# Patient Record
Sex: FEMALE | Race: WHITE | Employment: STUDENT | URBAN - METROPOLITAN AREA
[De-identification: names, ages, dates, MRNs, and addresses within clinical notes are randomized per-mention and may not be internally consistent; named-entity substitution may affect disease eponyms.]

---
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
## 2023-05-24 ENCOUNTER — TELEPHONE (OUTPATIENT)
Dept: INTERNAL MEDICINE | Age: 23
End: 2023-05-24

## 2023-07-03 ENCOUNTER — OFFICE VISIT (OUTPATIENT)
Dept: INTERNAL MEDICINE | Age: 23
End: 2023-07-03

## 2023-07-03 ENCOUNTER — LAB SERVICES (OUTPATIENT)
Dept: LAB | Age: 23
End: 2023-07-03

## 2023-07-03 VITALS
HEIGHT: 67 IN | TEMPERATURE: 97.7 F | DIASTOLIC BLOOD PRESSURE: 70 MMHG | SYSTOLIC BLOOD PRESSURE: 110 MMHG | WEIGHT: 181 LBS | BODY MASS INDEX: 28.41 KG/M2 | HEART RATE: 96 BPM | OXYGEN SATURATION: 98 %

## 2023-07-03 DIAGNOSIS — R53.82 CHRONIC FATIGUE: ICD-10-CM

## 2023-07-03 DIAGNOSIS — R53.83 DECREASED ENERGY: ICD-10-CM

## 2023-07-03 DIAGNOSIS — R42 DIZZINESS: ICD-10-CM

## 2023-07-03 DIAGNOSIS — F32.0 CURRENT MILD EPISODE OF MAJOR DEPRESSIVE DISORDER WITHOUT PRIOR EPISODE (CMD): ICD-10-CM

## 2023-07-03 DIAGNOSIS — R55 SYNCOPE, UNSPECIFIED SYNCOPE TYPE: ICD-10-CM

## 2023-07-03 DIAGNOSIS — R00.0 TACHYCARDIA: Primary | ICD-10-CM

## 2023-07-03 PROBLEM — F41.1 GAD (GENERALIZED ANXIETY DISORDER): Status: ACTIVE | Noted: 2017-07-18

## 2023-07-03 PROBLEM — F40.10 SOCIAL ANXIETY DISORDER: Status: ACTIVE | Noted: 2017-07-18

## 2023-07-03 LAB
ALBUMIN SERPL-MCNC: 3.8 G/DL (ref 3.6–5.1)
ALBUMIN/GLOB SERPL: 0.9 {RATIO} (ref 1–2.4)
ALP SERPL-CCNC: 111 UNITS/L (ref 45–117)
ALT SERPL-CCNC: 19 UNITS/L
ANION GAP SERPL CALC-SCNC: 13 MMOL/L (ref 7–19)
AST SERPL-CCNC: 14 UNITS/L
BILIRUB SERPL-MCNC: 0.2 MG/DL (ref 0.2–1)
BUN SERPL-MCNC: 9 MG/DL (ref 6–20)
BUN/CREAT SERPL: 14 (ref 7–25)
CALCIUM SERPL-MCNC: 8.9 MG/DL (ref 8.4–10.2)
CHLORIDE SERPL-SCNC: 101 MMOL/L (ref 97–110)
CO2 SERPL-SCNC: 28 MMOL/L (ref 21–32)
CREAT SERPL-MCNC: 0.64 MG/DL (ref 0.51–0.95)
FASTING DURATION TIME PATIENT: ABNORMAL H
GFR SERPLBLD BASED ON 1.73 SQ M-ARVRAT: >90 ML/MIN
GLOBULIN SER-MCNC: 4.1 G/DL (ref 2–4)
GLUCOSE SERPL-MCNC: 80 MG/DL (ref 70–99)
POTASSIUM SERPL-SCNC: 4.2 MMOL/L (ref 3.4–5.1)
PROT SERPL-MCNC: 7.9 G/DL (ref 6.4–8.2)
SODIUM SERPL-SCNC: 138 MMOL/L (ref 135–145)
TSH SERPL-ACNC: 1.74 MCUNITS/ML (ref 0.35–5)

## 2023-07-03 PROCEDURE — 80053 COMPREHEN METABOLIC PANEL: CPT | Performed by: INTERNAL MEDICINE

## 2023-07-03 PROCEDURE — 84443 ASSAY THYROID STIM HORMONE: CPT | Performed by: INTERNAL MEDICINE

## 2023-07-03 PROCEDURE — 99204 OFFICE O/P NEW MOD 45 MIN: CPT | Performed by: FAMILY MEDICINE

## 2023-07-03 PROCEDURE — 36415 COLL VENOUS BLD VENIPUNCTURE: CPT | Performed by: FAMILY MEDICINE

## 2023-07-03 RX ORDER — EPINEPHRINE 0.3 MG/.3ML
INJECTION SUBCUTANEOUS
COMMUNITY
Start: 2023-04-27

## 2023-07-03 RX ORDER — IBUPROFEN 400 MG/1
400 TABLET ORAL EVERY 6 HOURS PRN
COMMUNITY

## 2023-07-03 RX ORDER — LEVOCETIRIZINE DIHYDROCHLORIDE 5 MG/1
10 TABLET, FILM COATED ORAL DAILY
COMMUNITY

## 2023-07-03 RX ORDER — ETONOGESTREL AND ETHINYL ESTRADIOL .12; .015 MG/D; MG/D
RING VAGINAL
COMMUNITY
Start: 2023-06-06 | End: 2023-10-27 | Stop reason: SDUPTHER

## 2023-09-12 ENCOUNTER — OFFICE VISIT (OUTPATIENT)
Dept: CARDIOLOGY | Age: 23
End: 2023-09-12
Attending: FAMILY MEDICINE

## 2023-09-12 VITALS
WEIGHT: 182 LBS | BODY MASS INDEX: 28.56 KG/M2 | HEART RATE: 100 BPM | DIASTOLIC BLOOD PRESSURE: 82 MMHG | HEIGHT: 67 IN | SYSTOLIC BLOOD PRESSURE: 112 MMHG | OXYGEN SATURATION: 97 % | RESPIRATION RATE: 18 BRPM

## 2023-09-12 DIAGNOSIS — G90.A POTS (POSTURAL ORTHOSTATIC TACHYCARDIA SYNDROME): Primary | ICD-10-CM

## 2023-09-12 DIAGNOSIS — F41.9 ANXIETY: ICD-10-CM

## 2023-09-12 DIAGNOSIS — R42 LIGHTHEADED: ICD-10-CM

## 2023-09-12 DIAGNOSIS — R00.0 TACHYCARDIA, UNSPECIFIED: ICD-10-CM

## 2023-09-12 DIAGNOSIS — R42 DIZZINESS: ICD-10-CM

## 2023-09-12 PROCEDURE — 99204 OFFICE O/P NEW MOD 45 MIN: CPT | Performed by: INTERNAL MEDICINE

## 2023-09-12 RX ORDER — UBIDECARENONE 75 MG
50 CAPSULE ORAL DAILY
COMMUNITY

## 2023-09-12 RX ORDER — MIDODRINE HYDROCHLORIDE 2.5 MG/1
2.5 TABLET ORAL 2 TIMES DAILY
Qty: 60 TABLET | Refills: 6 | Status: SHIPPED | OUTPATIENT
Start: 2023-09-12 | End: 2023-10-11 | Stop reason: SDUPTHER

## 2023-09-12 RX ORDER — VITAMIN B COMPLEX
25 TABLET ORAL DAILY
COMMUNITY

## 2023-09-12 ASSESSMENT — PATIENT HEALTH QUESTIONNAIRE - PHQ9
2. FEELING DOWN, DEPRESSED OR HOPELESS: NOT AT ALL
CLINICAL INTERPRETATION OF PHQ2 SCORE: NO FURTHER SCREENING NEEDED
SUM OF ALL RESPONSES TO PHQ9 QUESTIONS 1 AND 2: 0
SUM OF ALL RESPONSES TO PHQ9 QUESTIONS 1 AND 2: 0
1. LITTLE INTEREST OR PLEASURE IN DOING THINGS: NOT AT ALL

## 2023-09-25 ENCOUNTER — ANCILLARY PROCEDURE (OUTPATIENT)
Dept: CARDIOLOGY | Age: 23
End: 2023-09-25
Attending: INTERNAL MEDICINE

## 2023-09-25 ENCOUNTER — TELEPHONE (OUTPATIENT)
Dept: CARDIOLOGY | Age: 23
End: 2023-09-25

## 2023-09-25 DIAGNOSIS — R00.0 TACHYCARDIA, UNSPECIFIED: ICD-10-CM

## 2023-09-25 DIAGNOSIS — R42 LIGHTHEADED: ICD-10-CM

## 2023-09-28 PROCEDURE — 93226 XTRNL ECG REC<48 HR SCAN A/R: CPT | Performed by: INTERNAL MEDICINE

## 2023-09-28 PROCEDURE — 93227 XTRNL ECG REC<48 HR R&I: CPT | Performed by: INTERNAL MEDICINE

## 2023-10-04 ENCOUNTER — OFFICE VISIT (OUTPATIENT)
Dept: INTERNAL MEDICINE | Age: 23
End: 2023-10-04

## 2023-10-04 ENCOUNTER — IMAGING SERVICES (OUTPATIENT)
Dept: GENERAL RADIOLOGY | Age: 23
End: 2023-10-04
Attending: FAMILY MEDICINE

## 2023-10-04 VITALS
SYSTOLIC BLOOD PRESSURE: 120 MMHG | BODY MASS INDEX: 29.19 KG/M2 | WEIGHT: 186 LBS | HEIGHT: 67 IN | DIASTOLIC BLOOD PRESSURE: 78 MMHG | HEART RATE: 92 BPM | TEMPERATURE: 96.7 F | OXYGEN SATURATION: 98 %

## 2023-10-04 DIAGNOSIS — R42 DIZZINESS: ICD-10-CM

## 2023-10-04 DIAGNOSIS — R29.898 TRANSIENT LEFT LEG WEAKNESS: ICD-10-CM

## 2023-10-04 DIAGNOSIS — R53.82 CHRONIC FATIGUE: Primary | ICD-10-CM

## 2023-10-04 DIAGNOSIS — R55 SYNCOPE, UNSPECIFIED SYNCOPE TYPE: ICD-10-CM

## 2023-10-04 DIAGNOSIS — R53.83 DECREASED ENERGY: ICD-10-CM

## 2023-10-04 DIAGNOSIS — R00.0 TACHYCARDIA: ICD-10-CM

## 2023-10-04 PROBLEM — F40.10 SOCIAL ANXIETY DISORDER: Status: RESOLVED | Noted: 2017-07-18 | Resolved: 2023-10-04

## 2023-10-04 PROCEDURE — 72110 X-RAY EXAM L-2 SPINE 4/>VWS: CPT | Performed by: RADIOLOGY

## 2023-10-04 PROCEDURE — 99214 OFFICE O/P EST MOD 30 MIN: CPT | Performed by: FAMILY MEDICINE

## 2023-10-04 ASSESSMENT — PATIENT HEALTH QUESTIONNAIRE - PHQ9
CLINICAL INTERPRETATION OF PHQ2 SCORE: NO FURTHER SCREENING NEEDED
SUM OF ALL RESPONSES TO PHQ9 QUESTIONS 1 AND 2: 0
2. FEELING DOWN, DEPRESSED OR HOPELESS: NOT AT ALL
SUM OF ALL RESPONSES TO PHQ9 QUESTIONS 1 AND 2: 0
1. LITTLE INTEREST OR PLEASURE IN DOING THINGS: NOT AT ALL

## 2023-10-06 ENCOUNTER — TELEPHONE (OUTPATIENT)
Dept: CARDIOLOGY | Age: 23
End: 2023-10-06

## 2023-10-06 DIAGNOSIS — R00.0 TACHYCARDIA, UNSPECIFIED: ICD-10-CM

## 2023-10-06 DIAGNOSIS — R42 LIGHTHEADED: ICD-10-CM

## 2023-10-06 DIAGNOSIS — G90.A POTS (POSTURAL ORTHOSTATIC TACHYCARDIA SYNDROME): ICD-10-CM

## 2023-10-11 ENCOUNTER — TELEPHONE (OUTPATIENT)
Dept: INTERNAL MEDICINE | Age: 23
End: 2023-10-11

## 2023-10-11 ENCOUNTER — TELEPHONE (OUTPATIENT)
Dept: CARDIOLOGY | Age: 23
End: 2023-10-11

## 2023-10-11 RX ORDER — MIDODRINE HYDROCHLORIDE 2.5 MG/1
5 TABLET ORAL 3 TIMES DAILY
Qty: 30 TABLET | Refills: 0 | Status: SHIPPED | COMMUNITY
Start: 2023-10-11 | End: 2023-11-14 | Stop reason: DRUGHIGH

## 2023-10-19 ENCOUNTER — TELEPHONE (OUTPATIENT)
Dept: INTERNAL MEDICINE | Age: 23
End: 2023-10-19

## 2023-10-27 RX ORDER — ETONOGESTREL AND ETHINYL ESTRADIOL .12; .015 MG/D; MG/D
1 RING VAGINAL SEE ADMIN INSTRUCTIONS
Qty: 1 EACH | Refills: 0 | Status: SHIPPED | OUTPATIENT
Start: 2023-10-27

## 2023-11-13 ENCOUNTER — OFFICE VISIT (OUTPATIENT)
Dept: INTERNAL MEDICINE | Age: 23
End: 2023-11-13

## 2023-11-13 VITALS
HEIGHT: 67 IN | WEIGHT: 186 LBS | DIASTOLIC BLOOD PRESSURE: 70 MMHG | SYSTOLIC BLOOD PRESSURE: 120 MMHG | TEMPERATURE: 96.5 F | HEART RATE: 110 BPM | OXYGEN SATURATION: 99 % | BODY MASS INDEX: 29.19 KG/M2

## 2023-11-13 DIAGNOSIS — F32.0 CURRENT MILD EPISODE OF MAJOR DEPRESSIVE DISORDER WITHOUT PRIOR EPISODE (CMD): ICD-10-CM

## 2023-11-13 DIAGNOSIS — F41.1 GAD (GENERALIZED ANXIETY DISORDER): ICD-10-CM

## 2023-11-13 DIAGNOSIS — R55 SYNCOPE, UNSPECIFIED SYNCOPE TYPE: ICD-10-CM

## 2023-11-13 DIAGNOSIS — R29.898 TRANSIENT LEFT LEG WEAKNESS: Primary | ICD-10-CM

## 2023-11-13 DIAGNOSIS — R00.0 TACHYCARDIA: ICD-10-CM

## 2023-11-13 PROCEDURE — 99214 OFFICE O/P EST MOD 30 MIN: CPT | Performed by: FAMILY MEDICINE

## 2023-11-13 ASSESSMENT — PATIENT HEALTH QUESTIONNAIRE - PHQ9
SUM OF ALL RESPONSES TO PHQ9 QUESTIONS 1 AND 2: 0
SUM OF ALL RESPONSES TO PHQ9 QUESTIONS 1 AND 2: 0
1. LITTLE INTEREST OR PLEASURE IN DOING THINGS: NOT AT ALL
CLINICAL INTERPRETATION OF PHQ2 SCORE: NO FURTHER SCREENING NEEDED
2. FEELING DOWN, DEPRESSED OR HOPELESS: NOT AT ALL

## 2023-11-14 ENCOUNTER — OFFICE VISIT (OUTPATIENT)
Dept: CARDIOLOGY | Age: 23
End: 2023-11-14

## 2023-11-14 VITALS
OXYGEN SATURATION: 98 % | BODY MASS INDEX: 29.66 KG/M2 | SYSTOLIC BLOOD PRESSURE: 128 MMHG | WEIGHT: 189 LBS | HEART RATE: 89 BPM | HEIGHT: 67 IN | DIASTOLIC BLOOD PRESSURE: 82 MMHG

## 2023-11-14 DIAGNOSIS — F40.10 SOCIAL ANXIETY DISORDER: ICD-10-CM

## 2023-11-14 DIAGNOSIS — R55 SYNCOPE AND COLLAPSE: Primary | ICD-10-CM

## 2023-11-14 DIAGNOSIS — F32.A DEPRESSION, UNSPECIFIED DEPRESSION TYPE: ICD-10-CM

## 2023-11-14 DIAGNOSIS — R42 LIGHTHEADED: ICD-10-CM

## 2023-11-14 DIAGNOSIS — R00.0 TACHYCARDIA, UNSPECIFIED: ICD-10-CM

## 2023-11-14 DIAGNOSIS — R10.9 STOMACH PAIN: ICD-10-CM

## 2023-11-14 DIAGNOSIS — R11.0 NAUSEA: ICD-10-CM

## 2023-11-14 DIAGNOSIS — G90.A POTS (POSTURAL ORTHOSTATIC TACHYCARDIA SYNDROME): ICD-10-CM

## 2023-11-14 PROCEDURE — 99214 OFFICE O/P EST MOD 30 MIN: CPT

## 2023-11-14 RX ORDER — MIDODRINE HYDROCHLORIDE 10 MG/1
10 TABLET ORAL 3 TIMES DAILY
Qty: 90 TABLET | Refills: 3 | Status: SHIPPED | OUTPATIENT
Start: 2023-11-14

## 2023-11-14 RX ORDER — PANTOPRAZOLE SODIUM 40 MG/1
40 TABLET, DELAYED RELEASE ORAL DAILY
Qty: 90 TABLET | Refills: 1 | Status: SHIPPED | OUTPATIENT
Start: 2023-11-14

## 2023-11-27 ENCOUNTER — APPOINTMENT (OUTPATIENT)
Dept: MRI IMAGING | Age: 23
End: 2023-11-27
Attending: FAMILY MEDICINE

## 2023-11-27 DIAGNOSIS — R29.898 TRANSIENT LEFT LEG WEAKNESS: ICD-10-CM

## 2023-11-27 PROCEDURE — 72148 MRI LUMBAR SPINE W/O DYE: CPT | Performed by: RADIOLOGY

## 2023-12-04 ENCOUNTER — E-ADVICE (OUTPATIENT)
Dept: GASTROENTEROLOGY | Age: 23
End: 2023-12-04

## 2023-12-04 ENCOUNTER — APPOINTMENT (OUTPATIENT)
Dept: GASTROENTEROLOGY | Age: 23
End: 2023-12-04

## 2023-12-04 VITALS — WEIGHT: 190.7 LBS | HEIGHT: 67 IN | BODY MASS INDEX: 29.93 KG/M2

## 2023-12-04 DIAGNOSIS — K59.00 CONSTIPATION, UNSPECIFIED CONSTIPATION TYPE: ICD-10-CM

## 2023-12-04 DIAGNOSIS — K58.1 IRRITABLE BOWEL SYNDROME WITH CONSTIPATION: ICD-10-CM

## 2023-12-04 DIAGNOSIS — R11.0 CHRONIC NAUSEA: Primary | ICD-10-CM

## 2023-12-04 PROCEDURE — 99204 OFFICE O/P NEW MOD 45 MIN: CPT | Performed by: STUDENT IN AN ORGANIZED HEALTH CARE EDUCATION/TRAINING PROGRAM

## 2023-12-04 RX ORDER — BISACODYL 5 MG/1
TABLET, DELAYED RELEASE ORAL
Qty: 2 TABLET | Refills: 0 | Status: SHIPPED | OUTPATIENT
Start: 2023-12-04 | End: 2024-01-18

## 2023-12-04 RX ORDER — SODIUM, POTASSIUM,MAG SULFATES 17.5-3.13G
SOLUTION, RECONSTITUTED, ORAL ORAL
Qty: 1 KIT | Refills: 0 | Status: SHIPPED | OUTPATIENT
Start: 2023-12-04 | End: 2024-01-18

## 2023-12-04 RX ORDER — SIMETHICONE 125 MG
TABLET,CHEWABLE ORAL
Qty: 2 TABLET | Refills: 0 | Status: SHIPPED | OUTPATIENT
Start: 2023-12-04 | End: 2024-01-18

## 2024-01-09 ENCOUNTER — HOSPITAL ENCOUNTER (OUTPATIENT)
Dept: GASTROENTEROLOGY | Age: 24
Discharge: HOME | End: 2024-01-09
Attending: STUDENT IN AN ORGANIZED HEALTH CARE EDUCATION/TRAINING PROGRAM

## 2024-01-09 ENCOUNTER — ANESTHESIA (OUTPATIENT)
Dept: GASTROENTEROLOGY | Age: 24
End: 2024-01-09

## 2024-01-09 ENCOUNTER — ANESTHESIA EVENT (OUTPATIENT)
Dept: GASTROENTEROLOGY | Age: 24
End: 2024-01-09

## 2024-01-09 VITALS
HEIGHT: 67 IN | DIASTOLIC BLOOD PRESSURE: 63 MMHG | OXYGEN SATURATION: 98 % | WEIGHT: 188.05 LBS | TEMPERATURE: 96.8 F | HEART RATE: 93 BPM | SYSTOLIC BLOOD PRESSURE: 129 MMHG | BODY MASS INDEX: 29.52 KG/M2 | RESPIRATION RATE: 16 BRPM

## 2024-01-09 DIAGNOSIS — R11.0 CHRONIC NAUSEA: ICD-10-CM

## 2024-01-09 DIAGNOSIS — K59.00 CONSTIPATION, UNSPECIFIED CONSTIPATION TYPE: ICD-10-CM

## 2024-01-09 LAB
B-HCG UR QL: NEGATIVE
INTERNAL PROCEDURAL CONTROLS ACCEPTABLE: YES
TEST LOT EXPIRATION DATE: NORMAL
TEST LOT NUMBER: NORMAL

## 2024-01-09 PROCEDURE — 13000028 HB GI MAJOR COMPLEX CASE S/U + 1ST 15 MIN

## 2024-01-09 PROCEDURE — 10004451 HB PACU RECOVERY 1ST 30 MINUTES

## 2024-01-09 PROCEDURE — 10002807 HB RX 258: Performed by: STUDENT IN AN ORGANIZED HEALTH CARE EDUCATION/TRAINING PROGRAM

## 2024-01-09 PROCEDURE — 88305 TISSUE EXAM BY PATHOLOGIST: CPT | Performed by: STUDENT IN AN ORGANIZED HEALTH CARE EDUCATION/TRAINING PROGRAM

## 2024-01-09 PROCEDURE — 45378 DIAGNOSTIC COLONOSCOPY: CPT | Performed by: STUDENT IN AN ORGANIZED HEALTH CARE EDUCATION/TRAINING PROGRAM

## 2024-01-09 PROCEDURE — 81025 URINE PREGNANCY TEST: CPT | Performed by: STUDENT IN AN ORGANIZED HEALTH CARE EDUCATION/TRAINING PROGRAM

## 2024-01-09 PROCEDURE — 10002801 HB RX 250 W/O HCPCS: Performed by: STUDENT IN AN ORGANIZED HEALTH CARE EDUCATION/TRAINING PROGRAM

## 2024-01-09 PROCEDURE — 10002800 HB RX 250 W HCPCS: Performed by: STUDENT IN AN ORGANIZED HEALTH CARE EDUCATION/TRAINING PROGRAM

## 2024-01-09 PROCEDURE — 43239 EGD BIOPSY SINGLE/MULTIPLE: CPT | Performed by: STUDENT IN AN ORGANIZED HEALTH CARE EDUCATION/TRAINING PROGRAM

## 2024-01-09 PROCEDURE — 13000001 HB PHASE II RECOVERY EA 30 MINUTES

## 2024-01-09 PROCEDURE — 13000029 HB GI MAJOR COMPLEX CASE EA ADD MINUTE

## 2024-01-09 PROCEDURE — 13000008 HB ANESTHESIA MAC OUTSIDE OR

## 2024-01-09 RX ORDER — SODIUM CHLORIDE 9 MG/ML
INJECTION, SOLUTION INTRAVENOUS CONTINUOUS
Status: DISCONTINUED | OUTPATIENT
Start: 2024-01-09 | End: 2024-01-11 | Stop reason: HOSPADM

## 2024-01-09 RX ORDER — POLYETHYLENE GLYCOL 3350 17 G/17G
17 POWDER, FOR SOLUTION ORAL DAILY
COMMUNITY

## 2024-01-09 RX ORDER — SODIUM CHLORIDE 9 MG/ML
INJECTION, SOLUTION INTRAVENOUS CONTINUOUS PRN
Status: DISCONTINUED | OUTPATIENT
Start: 2024-01-09 | End: 2024-01-09

## 2024-01-09 RX ORDER — PROPOFOL 10 MG/ML
INJECTION, EMULSION INTRAVENOUS PRN
Status: DISCONTINUED | OUTPATIENT
Start: 2024-01-09 | End: 2024-01-09

## 2024-01-09 RX ADMIN — FENTANYL CITRATE 50 MCG: 50 INJECTION INTRAMUSCULAR; INTRAVENOUS at 14:39

## 2024-01-09 RX ADMIN — PROPOFOL 70 MG: 10 INJECTION, EMULSION INTRAVENOUS at 14:37

## 2024-01-09 RX ADMIN — SODIUM CHLORIDE: 9 INJECTION, SOLUTION INTRAVENOUS at 14:30

## 2024-01-09 RX ADMIN — FENTANYL CITRATE 50 MCG: 50 INJECTION INTRAMUSCULAR; INTRAVENOUS at 14:35

## 2024-01-09 RX ADMIN — SODIUM CHLORIDE: 9 INJECTION, SOLUTION INTRAVENOUS at 14:32

## 2024-01-09 RX ADMIN — PROPOFOL 80 MCG/KG/MIN: 10 INJECTION, EMULSION INTRAVENOUS at 14:35

## 2024-01-09 SDOH — SOCIAL STABILITY: SOCIAL INSECURITY: HOW OFTEN DOES ANYONE, INCLUDING FAMILY AND FRIENDS, SCREAM OR CURSE AT YOU?: NEVER

## 2024-01-09 SDOH — SOCIAL STABILITY: SOCIAL INSECURITY: HOW OFTEN DOES ANYONE, INCLUDING FAMILY AND FRIENDS, THREATEN YOU WITH HARM?: NEVER

## 2024-01-09 SDOH — SOCIAL STABILITY: SOCIAL INSECURITY: HOW OFTEN DOES ANYONE, INCLUDING FAMILY AND FRIENDS, PHYSICALLY HURT YOU?: NEVER

## 2024-01-09 SDOH — SOCIAL STABILITY: SOCIAL INSECURITY: HOW OFTEN DOES ANYONE, INCLUDING FAMILY AND FRIENDS, INSULT OR TALK DOWN TO YOU?: NEVER

## 2024-01-09 ASSESSMENT — ACTIVITIES OF DAILY LIVING (ADL)
TRANSFERRING: DEPENDENT
ADL_BEFORE_ADMISSION: INDEPENDENT
ADL_SCORE: 12
BATHING: NEEDS ASSISTANCE
RECENT_DECLINE_ADL: NO
CHRONIC_PAIN_PRESENT: NO
ADL_SHORT_OF_BREATH: YES
SENSORY_SUPPORT_DEVICES: EYEGLASSES
HISTORY OF FALLING IN THE LAST YEAR (PRIOR TO ADMISSION): YES
NEEDS_ASSIST: YES
ADL_SHORT_OF_BREATH: NO
ADL_SCORE: 2
ADL_BEFORE_ADMISSION: NEEDS/REQUIRES ASSISTANCE
CONTINENCE: DEPENDENT
TOILETING: DEPENDENT
RECENT_DECLINE_ADL: YES, DECLINE IN BATHING/DRESSING/FEEDING, COLLABORATE WITH PROVIDER (T)
FEEDING: DEPENDENT
DRESSING: NEEDS ASSISTANCE

## 2024-01-09 ASSESSMENT — ENCOUNTER SYMPTOMS
EXERCISE TOLERANCE: GOOD (>4 METS)
SEIZURES: 1

## 2024-01-09 ASSESSMENT — COGNITIVE AND FUNCTIONAL STATUS - GENERAL
ARE YOU BLIND OR DO YOU HAVE SERIOUS DIFFICULTY SEEING, EVEN WHEN WEARING GLASSES: NO
ARE YOU DEAF OR DO YOU HAVE SERIOUS DIFFICULTY  HEARING: NO

## 2024-01-09 ASSESSMENT — PAIN SCALES - WONG BAKER: WONGBAKER_NUMERICALRESPONSE: 0

## 2024-01-09 ASSESSMENT — PAIN SCALES - GENERAL
PAINLEVEL_OUTOF10: 0
PAINLEVEL_OUTOF10: 0
PAINLEVEL_OUTOF10: 5

## 2024-01-11 LAB
ASR DISCLAIMER: NORMAL
CASE RPRT: NORMAL
CLINICAL INFO: NORMAL
PATH REPORT.FINAL DX SPEC: NORMAL
PATH REPORT.GROSS SPEC: NORMAL

## 2024-01-12 ENCOUNTER — APPOINTMENT (OUTPATIENT)
Dept: CARDIOLOGY | Age: 24
End: 2024-01-12

## 2024-01-12 ENCOUNTER — TELEPHONE (OUTPATIENT)
Dept: CARDIOLOGY | Age: 24
End: 2024-01-12

## 2024-01-12 VITALS — HEART RATE: 90 BPM

## 2024-01-12 DIAGNOSIS — G90.A POTS (POSTURAL ORTHOSTATIC TACHYCARDIA SYNDROME): Primary | ICD-10-CM

## 2024-01-12 DIAGNOSIS — R55 SYNCOPE AND COLLAPSE: ICD-10-CM

## 2024-01-12 DIAGNOSIS — R42 DIZZINESS: ICD-10-CM

## 2024-01-12 DIAGNOSIS — F40.10 SOCIAL ANXIETY DISORDER: ICD-10-CM

## 2024-01-12 DIAGNOSIS — R06.02 SOB (SHORTNESS OF BREATH): ICD-10-CM

## 2024-01-12 PROCEDURE — 99215 OFFICE O/P EST HI 40 MIN: CPT | Performed by: INTERNAL MEDICINE

## 2024-01-12 RX ORDER — FLUDROCORTISONE ACETATE 0.1 MG/1
0.1 TABLET ORAL DAILY
Qty: 30 TABLET | Refills: 6 | Status: SHIPPED | OUTPATIENT
Start: 2024-01-12

## 2024-01-12 ASSESSMENT — PATIENT HEALTH QUESTIONNAIRE - PHQ9
CLINICAL INTERPRETATION OF PHQ2 SCORE: NO FURTHER SCREENING NEEDED
2. FEELING DOWN, DEPRESSED OR HOPELESS: NOT AT ALL
SUM OF ALL RESPONSES TO PHQ9 QUESTIONS 1 AND 2: 0
SUM OF ALL RESPONSES TO PHQ9 QUESTIONS 1 AND 2: 0
1. LITTLE INTEREST OR PLEASURE IN DOING THINGS: NOT AT ALL

## 2024-03-11 ENCOUNTER — TELEPHONE (OUTPATIENT)
Dept: INTERNAL MEDICINE | Age: 24
End: 2024-03-11

## 2024-03-12 ENCOUNTER — OFFICE VISIT (OUTPATIENT)
Dept: INTERNAL MEDICINE | Age: 24
End: 2024-03-12

## 2024-03-12 VITALS
OXYGEN SATURATION: 98 % | WEIGHT: 199 LBS | DIASTOLIC BLOOD PRESSURE: 80 MMHG | SYSTOLIC BLOOD PRESSURE: 118 MMHG | BODY MASS INDEX: 31.17 KG/M2 | HEART RATE: 95 BPM | TEMPERATURE: 97.2 F

## 2024-03-12 DIAGNOSIS — R51.9 CHRONIC INTRACTABLE HEADACHE, UNSPECIFIED HEADACHE TYPE: Primary | ICD-10-CM

## 2024-03-12 DIAGNOSIS — G89.29 CHRONIC INTRACTABLE HEADACHE, UNSPECIFIED HEADACHE TYPE: Primary | ICD-10-CM

## 2024-03-12 PROCEDURE — 99214 OFFICE O/P EST MOD 30 MIN: CPT | Performed by: FAMILY MEDICINE

## 2024-03-12 RX ORDER — SUMATRIPTAN 50 MG/1
50 TABLET, FILM COATED ORAL DAILY PRN
Qty: 12 TABLET | Refills: 1 | Status: SHIPPED | OUTPATIENT
Start: 2024-03-12

## 2024-03-12 ASSESSMENT — PATIENT HEALTH QUESTIONNAIRE - PHQ9
SUM OF ALL RESPONSES TO PHQ9 QUESTIONS 1 AND 2: 0
2. FEELING DOWN, DEPRESSED OR HOPELESS: NOT AT ALL
SUM OF ALL RESPONSES TO PHQ9 QUESTIONS 1 AND 2: 0
CLINICAL INTERPRETATION OF PHQ2 SCORE: NO FURTHER SCREENING NEEDED
1. LITTLE INTEREST OR PLEASURE IN DOING THINGS: NOT AT ALL

## 2024-03-18 ENCOUNTER — LAB SERVICES (OUTPATIENT)
Dept: LAB | Age: 24
End: 2024-03-18

## 2024-03-18 ENCOUNTER — OFFICE VISIT (OUTPATIENT)
Dept: INTERNAL MEDICINE | Age: 24
End: 2024-03-18

## 2024-03-18 VITALS
SYSTOLIC BLOOD PRESSURE: 114 MMHG | HEART RATE: 94 BPM | WEIGHT: 199 LBS | BODY MASS INDEX: 31.17 KG/M2 | OXYGEN SATURATION: 97 % | TEMPERATURE: 96.2 F | DIASTOLIC BLOOD PRESSURE: 72 MMHG

## 2024-03-18 DIAGNOSIS — R06.02 SOB (SHORTNESS OF BREATH): ICD-10-CM

## 2024-03-18 DIAGNOSIS — G89.29 CHRONIC INTRACTABLE HEADACHE, UNSPECIFIED HEADACHE TYPE: ICD-10-CM

## 2024-03-18 DIAGNOSIS — R42 DIZZINESS: ICD-10-CM

## 2024-03-18 DIAGNOSIS — G90.A POTS (POSTURAL ORTHOSTATIC TACHYCARDIA SYNDROME): ICD-10-CM

## 2024-03-18 DIAGNOSIS — R51.9 CHRONIC INTRACTABLE HEADACHE, UNSPECIFIED HEADACHE TYPE: ICD-10-CM

## 2024-03-18 DIAGNOSIS — R07.9 CHEST PAIN, UNSPECIFIED TYPE: Primary | ICD-10-CM

## 2024-03-18 DIAGNOSIS — R07.9 CHEST PAIN, UNSPECIFIED TYPE: ICD-10-CM

## 2024-03-18 LAB — D DIMER PPP FEU-MCNC: 0.28 MG/L (FEU)

## 2024-03-18 PROCEDURE — 36415 COLL VENOUS BLD VENIPUNCTURE: CPT | Performed by: FAMILY MEDICINE

## 2024-03-18 PROCEDURE — 85379 FIBRIN DEGRADATION QUANT: CPT | Performed by: INTERNAL MEDICINE

## 2024-03-18 PROCEDURE — 93000 ELECTROCARDIOGRAM COMPLETE: CPT | Performed by: FAMILY MEDICINE

## 2024-03-18 PROCEDURE — 99214 OFFICE O/P EST MOD 30 MIN: CPT | Performed by: FAMILY MEDICINE

## 2024-03-18 RX ORDER — METHYLPREDNISOLONE 4 MG
TABLET, DOSE PACK ORAL
Qty: 1 TABLET | Refills: 0 | Status: SHIPPED | OUTPATIENT
Start: 2024-03-18

## 2024-03-18 RX ORDER — MECLIZINE HYDROCHLORIDE 25 MG/1
25 TABLET ORAL 3 TIMES DAILY PRN
Qty: 30 TABLET | Refills: 1 | Status: SHIPPED | OUTPATIENT
Start: 2024-03-18

## 2024-03-18 ASSESSMENT — PATIENT HEALTH QUESTIONNAIRE - PHQ9
SUM OF ALL RESPONSES TO PHQ9 QUESTIONS 1 AND 2: 2
CLINICAL INTERPRETATION OF PHQ2 SCORE: NO FURTHER SCREENING NEEDED
2. FEELING DOWN, DEPRESSED OR HOPELESS: MORE THAN HALF THE DAYS
SUM OF ALL RESPONSES TO PHQ9 QUESTIONS 1 AND 2: 2
1. LITTLE INTEREST OR PLEASURE IN DOING THINGS: NOT AT ALL

## 2024-04-03 ENCOUNTER — APPOINTMENT (OUTPATIENT)
Dept: MRI IMAGING | Age: 24
End: 2024-04-03
Attending: FAMILY MEDICINE

## 2024-04-03 DIAGNOSIS — G89.29 CHRONIC INTRACTABLE HEADACHE, UNSPECIFIED HEADACHE TYPE: ICD-10-CM

## 2024-04-03 DIAGNOSIS — R51.9 CHRONIC INTRACTABLE HEADACHE, UNSPECIFIED HEADACHE TYPE: ICD-10-CM

## 2024-04-03 PROCEDURE — 70551 MRI BRAIN STEM W/O DYE: CPT | Performed by: RADIOLOGY

## 2024-05-13 ENCOUNTER — MED INFO FORMS (OUTPATIENT)
Dept: HEALTH INFORMATION MANAGEMENT | Facility: OTHER | Age: 24
End: 2024-05-13

## 2024-05-13 ENCOUNTER — APPOINTMENT (OUTPATIENT)
Dept: INTERNAL MEDICINE | Age: 24
End: 2024-05-13

## 2024-05-13 VITALS
TEMPERATURE: 96 F | OXYGEN SATURATION: 97 % | BODY MASS INDEX: 31.32 KG/M2 | WEIGHT: 200 LBS | SYSTOLIC BLOOD PRESSURE: 106 MMHG | DIASTOLIC BLOOD PRESSURE: 74 MMHG | HEART RATE: 107 BPM

## 2024-05-13 DIAGNOSIS — R51.9 CHRONIC INTRACTABLE HEADACHE, UNSPECIFIED HEADACHE TYPE: ICD-10-CM

## 2024-05-13 DIAGNOSIS — G40.909 SEIZURE DISORDER  (CMD): ICD-10-CM

## 2024-05-13 DIAGNOSIS — R42 DIZZINESS: ICD-10-CM

## 2024-05-13 DIAGNOSIS — F43.10 POSTTRAUMATIC STRESS DISORDER: ICD-10-CM

## 2024-05-13 DIAGNOSIS — F32.0 CURRENT MILD EPISODE OF MAJOR DEPRESSIVE DISORDER WITHOUT PRIOR EPISODE (CMD): ICD-10-CM

## 2024-05-13 DIAGNOSIS — G90.A POTS (POSTURAL ORTHOSTATIC TACHYCARDIA SYNDROME): Primary | ICD-10-CM

## 2024-05-13 DIAGNOSIS — F41.1 GAD (GENERALIZED ANXIETY DISORDER): ICD-10-CM

## 2024-05-13 DIAGNOSIS — R06.02 SOB (SHORTNESS OF BREATH): ICD-10-CM

## 2024-05-13 DIAGNOSIS — G89.29 CHRONIC INTRACTABLE HEADACHE, UNSPECIFIED HEADACHE TYPE: ICD-10-CM

## 2024-05-13 PROCEDURE — 99213 OFFICE O/P EST LOW 20 MIN: CPT | Performed by: FAMILY MEDICINE

## 2024-05-13 ASSESSMENT — PATIENT HEALTH QUESTIONNAIRE - PHQ9
CLINICAL INTERPRETATION OF PHQ2 SCORE: NO FURTHER SCREENING NEEDED
2. FEELING DOWN, DEPRESSED OR HOPELESS: NOT AT ALL
1. LITTLE INTEREST OR PLEASURE IN DOING THINGS: NOT AT ALL
SUM OF ALL RESPONSES TO PHQ9 QUESTIONS 1 AND 2: 0
SUM OF ALL RESPONSES TO PHQ9 QUESTIONS 1 AND 2: 0

## 2024-08-08 ENCOUNTER — NURSE TRIAGE (OUTPATIENT)
Dept: FAMILY MEDICINE | Age: 24
End: 2024-08-08

## 2024-11-06 SDOH — ECONOMIC STABILITY: HOUSING INSECURITY: DO YOU HAVE PROBLEMS WITH ANY OF THE FOLLOWING?: NONE OF THE ABOVE

## 2024-11-06 SDOH — ECONOMIC STABILITY: TRANSPORTATION INSECURITY
IN THE PAST 12 MONTHS, HAS LACK OF RELIABLE TRANSPORTATION KEPT YOU FROM MEDICAL APPOINTMENTS, MEETINGS, WORK OR FROM GETTING THINGS NEEDED FOR DAILY LIVING?: NO

## 2024-11-06 SDOH — ECONOMIC STABILITY: FOOD INSECURITY: WITHIN THE PAST 12 MONTHS, THE FOOD YOU BOUGHT JUST DIDN'T LAST AND YOU DIDN'T HAVE MONEY TO GET MORE.: NEVER TRUE

## 2024-11-06 SDOH — ECONOMIC STABILITY: GENERAL: WOULD YOU LIKE HELP WITH ANY OF THE FOLLOWING NEEDS?: I DON'T WANT HELP WITH ANY OF THESE

## 2024-11-06 SDOH — ECONOMIC STABILITY: HOUSING INSECURITY: WHAT IS YOUR LIVING SITUATION TODAY?: I HAVE A STEADY PLACE TO LIVE

## 2024-11-06 ASSESSMENT — SOCIAL DETERMINANTS OF HEALTH (SDOH): IN THE PAST 12 MONTHS, HAS THE ELECTRIC, GAS, OIL, OR WATER COMPANY THREATENED TO SHUT OFF SERVICE IN YOUR HOME?: NO

## 2024-11-13 ENCOUNTER — APPOINTMENT (OUTPATIENT)
Dept: INTERNAL MEDICINE | Age: 24
End: 2024-11-13

## 2024-11-13 VITALS
DIASTOLIC BLOOD PRESSURE: 62 MMHG | HEART RATE: 84 BPM | WEIGHT: 212 LBS | OXYGEN SATURATION: 97 % | TEMPERATURE: 97 F | HEIGHT: 67 IN | BODY MASS INDEX: 33.27 KG/M2 | SYSTOLIC BLOOD PRESSURE: 102 MMHG

## 2024-11-13 DIAGNOSIS — Z00.00 ROUTINE GENERAL MEDICAL EXAMINATION AT A HEALTH CARE FACILITY: Primary | ICD-10-CM

## 2024-11-13 DIAGNOSIS — F32.0 CURRENT MILD EPISODE OF MAJOR DEPRESSIVE DISORDER WITHOUT PRIOR EPISODE (CMD): ICD-10-CM

## 2024-11-13 DIAGNOSIS — F41.1 GAD (GENERALIZED ANXIETY DISORDER): ICD-10-CM

## 2024-11-13 DIAGNOSIS — Z23 NEED FOR VACCINATION: ICD-10-CM

## 2024-11-13 DIAGNOSIS — F43.10 POSTTRAUMATIC STRESS DISORDER: ICD-10-CM

## 2024-11-13 DIAGNOSIS — G90.A POTS (POSTURAL ORTHOSTATIC TACHYCARDIA SYNDROME): ICD-10-CM

## 2024-11-13 DIAGNOSIS — R10.31 RLQ ABDOMINAL PAIN: ICD-10-CM

## 2024-11-13 DIAGNOSIS — G40.909 SEIZURE DISORDER  (CMD): ICD-10-CM

## 2025-01-07 ENCOUNTER — TELEPHONE (OUTPATIENT)
Dept: INTERNAL MEDICINE | Age: 25
End: 2025-01-07

## 2025-01-09 ENCOUNTER — TELEPHONE (OUTPATIENT)
Dept: FAMILY MEDICINE | Age: 25
End: 2025-01-09

## 2025-02-11 ENCOUNTER — E-ADVICE (OUTPATIENT)
Dept: INTERNAL MEDICINE | Age: 25
End: 2025-02-11

## 2025-05-30 ENCOUNTER — E-ADVICE (OUTPATIENT)
Dept: NUTRITION | Age: 25
End: 2025-05-30

## 2025-05-30 ENCOUNTER — APPOINTMENT (OUTPATIENT)
Dept: INTERNAL MEDICINE | Age: 25
End: 2025-05-30

## 2025-05-30 ENCOUNTER — LAB SERVICES (OUTPATIENT)
Dept: LAB | Age: 25
End: 2025-05-30

## 2025-05-30 VITALS
HEIGHT: 67 IN | HEART RATE: 78 BPM | WEIGHT: 239.6 LBS | OXYGEN SATURATION: 97 % | TEMPERATURE: 97.8 F | DIASTOLIC BLOOD PRESSURE: 66 MMHG | BODY MASS INDEX: 37.61 KG/M2 | SYSTOLIC BLOOD PRESSURE: 104 MMHG

## 2025-05-30 DIAGNOSIS — M25.50 ARTHRALGIA OF MULTIPLE SITES: ICD-10-CM

## 2025-05-30 DIAGNOSIS — E66.812 CLASS 2 OBESITY DUE TO EXCESS CALORIES WITHOUT SERIOUS COMORBIDITY WITH BODY MASS INDEX (BMI) OF 37.0 TO 37.9 IN ADULT: Primary | ICD-10-CM

## 2025-05-30 DIAGNOSIS — E66.812 CLASS 2 OBESITY DUE TO EXCESS CALORIES WITHOUT SERIOUS COMORBIDITY WITH BODY MASS INDEX (BMI) OF 37.0 TO 37.9 IN ADULT: ICD-10-CM

## 2025-05-30 DIAGNOSIS — F41.1 GAD (GENERALIZED ANXIETY DISORDER): ICD-10-CM

## 2025-05-30 DIAGNOSIS — R53.82 CHRONIC FATIGUE: ICD-10-CM

## 2025-05-30 DIAGNOSIS — R13.10 DYSPHAGIA, UNSPECIFIED TYPE: ICD-10-CM

## 2025-05-30 DIAGNOSIS — R41.89 BRAIN FOG: ICD-10-CM

## 2025-05-30 DIAGNOSIS — E66.09 CLASS 2 OBESITY DUE TO EXCESS CALORIES WITHOUT SERIOUS COMORBIDITY WITH BODY MASS INDEX (BMI) OF 37.0 TO 37.9 IN ADULT: ICD-10-CM

## 2025-05-30 DIAGNOSIS — R00.2 HEART PALPITATIONS: ICD-10-CM

## 2025-05-30 DIAGNOSIS — F43.10 POSTTRAUMATIC STRESS DISORDER: ICD-10-CM

## 2025-05-30 DIAGNOSIS — F32.0 CURRENT MILD EPISODE OF MAJOR DEPRESSIVE DISORDER WITHOUT PRIOR EPISODE (CMD): ICD-10-CM

## 2025-05-30 DIAGNOSIS — E66.09 CLASS 2 OBESITY DUE TO EXCESS CALORIES WITHOUT SERIOUS COMORBIDITY WITH BODY MASS INDEX (BMI) OF 37.0 TO 37.9 IN ADULT: Primary | ICD-10-CM

## 2025-05-30 DIAGNOSIS — R06.89 BREATHING DIFFICULTY: ICD-10-CM

## 2025-05-30 DIAGNOSIS — G40.909 SEIZURE DISORDER  (CMD): ICD-10-CM

## 2025-05-30 DIAGNOSIS — E16.2 HYPOGLYCEMIA: ICD-10-CM

## 2025-05-30 LAB
25(OH)D3+25(OH)D2 SERPL-MCNC: 21 NG/ML (ref 30–100)
ALBUMIN SERPL-MCNC: 4.1 G/DL (ref 3.4–5)
ALBUMIN/GLOB SERPL: 1.1 {RATIO} (ref 1–2.4)
ALP SERPL-CCNC: 161 UNITS/L (ref 45–117)
ALT SERPL-CCNC: 38 UNITS/L
ANION GAP SERPL CALC-SCNC: 14 MMOL/L (ref 7–19)
AST SERPL-CCNC: 25 UNITS/L
BASOPHILS # BLD: 0.1 K/MCL (ref 0–0.3)
BASOPHILS NFR BLD: 1 %
BILIRUB SERPL-MCNC: 0.5 MG/DL (ref 0.2–1)
BUN SERPL-MCNC: 9 MG/DL (ref 6–20)
BUN/CREAT SERPL: 15 (ref 7–25)
CALCIUM SERPL-MCNC: 9.6 MG/DL (ref 8.4–10.2)
CHLORIDE SERPL-SCNC: 101 MMOL/L (ref 97–110)
CO2 SERPL-SCNC: 29 MMOL/L (ref 21–32)
CREAT SERPL-MCNC: 0.59 MG/DL (ref 0.51–0.95)
DEPRECATED RDW RBC: 38.2 FL (ref 39–50)
EGFRCR SERPLBLD CKD-EPI 2021: >90 ML/MIN/{1.73_M2}
EOSINOPHIL # BLD: 0.2 K/MCL (ref 0–0.5)
EOSINOPHIL NFR BLD: 3 %
ERYTHROCYTE [DISTWIDTH] IN BLOOD: 11.9 % (ref 11–15)
FASTING DURATION TIME PATIENT: ABNORMAL H
FOLATE SERPL-MCNC: 11.7 NG/ML
GLOBULIN SER-MCNC: 3.7 G/DL (ref 2–4)
GLUCOSE SERPL-MCNC: 84 MG/DL (ref 70–99)
HCT VFR BLD CALC: 43.5 % (ref 36–46.5)
HGB BLD-MCNC: 14.7 G/DL (ref 12–15.5)
IMM GRANULOCYTES # BLD AUTO: 0 K/MCL (ref 0–0.2)
IMM GRANULOCYTES # BLD: 0 %
LYMPHOCYTES # BLD: 1.5 K/MCL (ref 1–4.8)
LYMPHOCYTES NFR BLD: 21 %
MCH RBC QN AUTO: 29.6 PG (ref 26–34)
MCHC RBC AUTO-ENTMCNC: 33.8 G/DL (ref 32–36.5)
MCV RBC AUTO: 87.7 FL (ref 78–100)
MONOCYTES # BLD: 0.7 K/MCL (ref 0.3–0.9)
MONOCYTES NFR BLD: 10 %
NEUTROPHILS # BLD: 4.9 K/MCL (ref 1.8–7.7)
NEUTROPHILS NFR BLD: 65 %
NRBC BLD MANUAL-RTO: 0 /100 WBC
PLATELET # BLD AUTO: 333 K/MCL (ref 140–450)
POTASSIUM SERPL-SCNC: 4.3 MMOL/L (ref 3.4–5.1)
PROT SERPL-MCNC: 7.8 G/DL (ref 6.4–8.2)
RBC # BLD: 4.96 MIL/MCL (ref 4–5.2)
SODIUM SERPL-SCNC: 140 MMOL/L (ref 135–145)
TSH SERPL-ACNC: 1.99 MCUNITS/ML (ref 0.35–5)
VIT B12 SERPL-MCNC: 790 PG/ML (ref 211–911)
WBC # BLD: 7.5 K/MCL (ref 4.2–11)

## 2025-05-30 PROCEDURE — 82306 VITAMIN D 25 HYDROXY: CPT | Performed by: INTERNAL MEDICINE

## 2025-05-30 PROCEDURE — 80050 GENERAL HEALTH PANEL: CPT | Performed by: INTERNAL MEDICINE

## 2025-05-30 PROCEDURE — 82607 VITAMIN B-12: CPT | Performed by: CLINICAL MEDICAL LABORATORY

## 2025-05-30 PROCEDURE — 82746 ASSAY OF FOLIC ACID SERUM: CPT | Performed by: CLINICAL MEDICAL LABORATORY

## 2025-05-30 PROCEDURE — 36415 COLL VENOUS BLD VENIPUNCTURE: CPT | Performed by: FAMILY MEDICINE

## 2025-05-30 RX ORDER — BUPROPION HYDROCHLORIDE 300 MG/1
300 TABLET ORAL DAILY
COMMUNITY
Start: 2025-05-29

## 2025-05-30 RX ORDER — BUPROPION HYDROCHLORIDE 150 MG/1
150 TABLET ORAL DAILY
COMMUNITY
End: 2025-05-30 | Stop reason: DRUGHIGH

## 2025-05-30 SDOH — ECONOMIC STABILITY: HOUSING INSECURITY: DO YOU HAVE PROBLEMS WITH ANY OF THE FOLLOWING?: NONE OF THE ABOVE

## 2025-05-30 SDOH — ECONOMIC STABILITY: FOOD INSECURITY: WITHIN THE PAST 12 MONTHS, THE FOOD YOU BOUGHT JUST DIDN'T LAST AND YOU DIDN'T HAVE MONEY TO GET MORE.: NEVER TRUE

## 2025-05-30 SDOH — ECONOMIC STABILITY: HOUSING INSECURITY: WHAT IS YOUR LIVING SITUATION TODAY?: I HAVE A STEADY PLACE TO LIVE

## 2025-05-30 SDOH — ECONOMIC STABILITY: TRANSPORTATION INSECURITY
IN THE PAST 12 MONTHS, HAS LACK OF RELIABLE TRANSPORTATION KEPT YOU FROM MEDICAL APPOINTMENTS, MEETINGS, WORK OR FROM GETTING THINGS NEEDED FOR DAILY LIVING?: YES

## 2025-05-30 ASSESSMENT — PATIENT HEALTH QUESTIONNAIRE - PHQ9
CLINICAL INTERPRETATION OF PHQ2 SCORE: NO FURTHER SCREENING NEEDED
2. FEELING DOWN, DEPRESSED OR HOPELESS: NOT AT ALL
SUM OF ALL RESPONSES TO PHQ9 QUESTIONS 1 AND 2: 0
1. LITTLE INTEREST OR PLEASURE IN DOING THINGS: NOT AT ALL
SUM OF ALL RESPONSES TO PHQ9 QUESTIONS 1 AND 2: 0

## 2025-05-30 ASSESSMENT — SOCIAL DETERMINANTS OF HEALTH (SDOH): IN THE PAST 12 MONTHS, HAS THE ELECTRIC, GAS, OIL, OR WATER COMPANY THREATENED TO SHUT OFF SERVICE IN YOUR HOME?: NO

## 2025-06-05 ENCOUNTER — RESULTS FOLLOW-UP (OUTPATIENT)
Dept: INTERNAL MEDICINE | Age: 25
End: 2025-06-05

## 2025-06-05 ENCOUNTER — E-ADVICE (OUTPATIENT)
Dept: INTERNAL MEDICINE | Age: 25
End: 2025-06-05

## 2025-06-05 DIAGNOSIS — E55.9 VITAMIN D DEFICIENCY: Primary | ICD-10-CM

## 2025-06-05 DIAGNOSIS — R74.8 ELEVATED ALKALINE PHOSPHATASE LEVEL: ICD-10-CM

## 2025-06-05 RX ORDER — ERGOCALCIFEROL 1.25 MG/1
1.25 CAPSULE ORAL
Qty: 4 CAPSULE | Refills: 1 | Status: SHIPPED | OUTPATIENT
Start: 2025-06-05

## 2025-06-06 ENCOUNTER — E-ADVICE (OUTPATIENT)
Dept: INTERNAL MEDICINE | Age: 25
End: 2025-06-06

## 2025-06-07 ENCOUNTER — APPOINTMENT (OUTPATIENT)
Dept: INTERNAL MEDICINE | Age: 25
End: 2025-06-07

## 2025-06-21 ENCOUNTER — APPOINTMENT (OUTPATIENT)
Dept: NUTRITION | Age: 25
End: 2025-06-21

## 2025-06-21 DIAGNOSIS — E66.812 CLASS 2 OBESITY DUE TO EXCESS CALORIES WITHOUT SERIOUS COMORBIDITY WITH BODY MASS INDEX (BMI) OF 37.0 TO 37.9 IN ADULT: Primary | ICD-10-CM

## 2025-06-21 DIAGNOSIS — E66.09 CLASS 2 OBESITY DUE TO EXCESS CALORIES WITHOUT SERIOUS COMORBIDITY WITH BODY MASS INDEX (BMI) OF 37.0 TO 37.9 IN ADULT: Primary | ICD-10-CM

## 2025-07-18 ENCOUNTER — APPOINTMENT (OUTPATIENT)
Dept: INTERNAL MEDICINE | Age: 25
End: 2025-07-18

## 2025-07-21 ENCOUNTER — TELEPHONE (OUTPATIENT)
Dept: FAMILY MEDICINE | Age: 25
End: 2025-07-21

## 2025-07-28 ENCOUNTER — TELEPHONE (OUTPATIENT)
Dept: INTERNAL MEDICINE | Age: 25
End: 2025-07-28

## 2025-07-29 ENCOUNTER — EXTERNAL LAB (OUTPATIENT)
Dept: HEALTH INFORMATION MANAGEMENT | Facility: OTHER | Age: 25
End: 2025-07-29

## 2025-07-29 ENCOUNTER — E-ADVICE (OUTPATIENT)
Dept: INTERNAL MEDICINE | Age: 25
End: 2025-07-29

## 2025-07-29 DIAGNOSIS — R53.83 OTHER FATIGUE: Primary | ICD-10-CM

## 2025-07-29 LAB
25(OH)D3+25(OH)D2 SERPL-MCNC: 19.2 NG/ML (ref 30–100)
ALP SERPL-CCNC: 144 IU/L (ref 44–121)

## 2025-07-30 ENCOUNTER — E-ADVICE (OUTPATIENT)
Dept: INTERNAL MEDICINE | Age: 25
End: 2025-07-30

## 2025-07-30 DIAGNOSIS — E55.9 VITAMIN D DEFICIENCY: Primary | ICD-10-CM

## (undated) NOTE — ED AVS SNAPSHOT
Quin Malone   MRN: [id number]    Department:  [facility] Emergency Department   Date of Visit:  12/14/2018           Disclosure     Insurance plans vary and the physician(s) referred by the ER may not be covered by your plan.  Please contact yo tell this physician (or your personal doctor if your instructions are to return to your personal doctor) about any new or lasting problems. The primary care or specialist physician will see patients referred from the [facility] Emergency Department.  Krystle Martinez